# Patient Record
Sex: MALE | URBAN - METROPOLITAN AREA
[De-identification: names, ages, dates, MRNs, and addresses within clinical notes are randomized per-mention and may not be internally consistent; named-entity substitution may affect disease eponyms.]

---

## 2017-05-14 ENCOUNTER — HOSPITAL ENCOUNTER (EMERGENCY)
Facility: CLINIC | Age: 22
Discharge: HOME | End: 2017-05-14
Attending: INTERNAL MEDICINE | Admitting: INTERNAL MEDICINE

## 2017-05-14 VITALS
RESPIRATION RATE: 20 BRPM | TEMPERATURE: 98.5 F | DIASTOLIC BLOOD PRESSURE: 90 MMHG | OXYGEN SATURATION: 98 % | SYSTOLIC BLOOD PRESSURE: 136 MMHG

## 2017-05-14 DIAGNOSIS — H16.002 CORNEAL ULCER, LEFT: ICD-10-CM

## 2017-05-14 PROCEDURE — 99283 EMERGENCY DEPT VISIT LOW MDM: CPT

## 2017-05-14 RX ORDER — PROPARACAINE HYDROCHLORIDE 5 MG/ML
SOLUTION/ DROPS OPHTHALMIC
Status: DISCONTINUED
Start: 2017-05-14 | End: 2017-05-14 | Stop reason: HOSPADM

## 2017-05-14 ASSESSMENT — ENCOUNTER SYMPTOMS
EYE REDNESS: 1
EYE ITCHING: 1
EYE PAIN: 1

## 2017-05-14 NOTE — DISCHARGE INSTRUCTIONS
Corneal Ulcer    The cornea is the clear part in the front of the eye. A corneal ulcer is an open sore on the cornea.    The most common cause of a corneal ulcer is infection by bacteria, virus, or fungus. A scratch to the cornea that becomes infected can lead to a corneal ulcer. Use of contact lenses also increases the risk of a corneal ulcer from a bacterial infection. This can happen especially if contact lenses are not kept clean or are worn while sleeping. Any condition that causes dry eyes, such as decreased tear production or inability to blink normally, will increase the risk of a corneal ulcer. Chemical injury to the eye is another risk factor for a corneal ulcer.    A corneal ulcer may cause redness, pain, increased tears, and pus or mucus draining from the eye. Your vision may be blurry and the eyelid may swell.    Corneal ulcers are very serious. They may cause permanent scarring to the eye, with partial or complete blindness. Therefore, this condition must be recognized and treated very carefully. With proper treatment, corneal ulcers should improve in 2 to 3 weeks. Follow-up with an ophthalmologist (an eye MD) is very important.     Do not wear contact lenses until approved by your eye doctor.    Do not sleep in contact lenses.    Do not soak your contact lenses in anything other than sterile solution specifically made for contact lenses     Apply a cool compress to the eye (towel soaked in cool water).    Do not touch or rub your eye with your fingers.    Wash your hands often to prevent spread of the infection to the other eye.    You may use acetaminophen or ibuprofen to control pain, unless another pain medicine was prescribed. (Note: If you have chronic liver or kidney disease or have ever had a stomach ulcer, talk with your doctor before using these medicines.)    Use prescribed antibiotic eye drops or ointment exactly as directed.  Home care  Follow-up care  Follow up with your eye doctor in  1 to 2 days, or as advised.    For more information, see the American Academy of Ophthalmology web page on corneal ulcers: www.geteyesmart.org/eyesmart/diseases/corneal-ulcer.cfm.  When to seek medical advice  Call your healthcare provider right away if any of these occur:    Worsening vision    Increasing pain in the eye    Increased discharge from the eye    Increased redness of the eyes    Fever of 100.4 F (38 C) or higher, or as directed by your healthcare provider    9044-7895 [employer]. [address] [place] [address]. All rights reserved. This information is not intended as a substitute for professional medical care. Always follow your healthcare professional's instructions.        Remember that you can always come back to the Emergency Department if you are not able to see your regular doctor in the amount of time listed above, if you get any new symptoms, or if there is anything that worries you.

## 2017-05-14 NOTE — ED AVS SNAPSHOT
[facility] Emergency Department    [address] [place] [address]    Phone:  [phone]    Fax:  [phone]                                       Walter Crook   MRN: [id number]    Department:  [facility] Emergency Department   Date of Visit:  5/14/2017           Patient Information     Date Of Birth          1995        Your diagnoses for this visit were:     Corneal ulcer, left        You were seen by Merry Sorto MD.      Follow-up Information     Follow up In 1 day.    Why:  see your eye doctor tomorrow        Discharge Instructions           Corneal Ulcer    The cornea is the clear part in the front of the eye. A corneal ulcer is an open sore on the cornea.    The most common cause of a corneal ulcer is infection by bacteria, virus, or fungus. A scratch to the cornea that becomes infected can lead to a corneal ulcer. Use of contact lenses also increases the risk of a corneal ulcer from a bacterial infection. This can happen especially if contact lenses are not kept clean or are worn while sleeping. Any condition that causes dry eyes, such as decreased tear production or inability to blink normally, will increase the risk of a corneal ulcer. Chemical injury to the eye is another risk factor for a corneal ulcer.    A corneal ulcer may cause redness, pain, increased tears, and pus or mucus draining from the eye. Your vision may be blurry and the eyelid may swell.    Corneal ulcers are very serious. They may cause permanent scarring to the eye, with partial or complete blindness. Therefore, this condition must be recognized and treated very carefully. With proper treatment, corneal ulcers should improve in 2 to 3 weeks. Follow-up with an ophthalmologist (an eye MD) is very important.     Do not wear contact lenses until approved by your eye doctor.    Do not sleep in contact lenses.    Do not soak your contact lenses in anything other than sterile  solution specifically made for contact lenses     Apply a cool compress to the eye (towel soaked in cool water).    Do not touch or rub your eye with your fingers.    Wash your hands often to prevent spread of the infection to the other eye.    You may use acetaminophen or ibuprofen to control pain, unless another pain medicine was prescribed. (Note: If you have chronic liver or kidney disease or have ever had a stomach ulcer, talk with your doctor before using these medicines.)    Use prescribed antibiotic eye drops or ointment exactly as directed.  Home care  Follow-up care  Follow up with your eye doctor in 1 to 2 days, or as advised.    For more information, see the American Academy of Ophthalmology web page on corneal ulcers: www.geteyesmart.org/eyesmart/diseases/corneal-ulcer.cfm.  When to seek medical advice  Call your healthcare provider right away if any of these occur:    Worsening vision    Increasing pain in the eye    Increased discharge from the eye    Increased redness of the eyes    Fever of 100.4 F (38 C) or higher, or as directed by your healthcare provider    6775-8036 [employer]. [address] [place] [address]. All rights reserved. This information is not intended as a substitute for professional medical care. Always follow your healthcare professional's instructions.        Remember that you can always come back to the Emergency Department if you are not able to see your regular doctor in the amount of time listed above, if you get any new symptoms, or if there is anything that worries you.      24 Hour Appointment Hotline       To make an appointment at any [facility], call [phone] ([phone]). If you don't have a family doctor or clinic, we will help you find one. [facility] [place] clinics are conveniently located to serve the needs of you and your family.             Review of your medicines      START taking        Dose / Directions Last dose taken     tobramycin 0.3 % Oint ophthalmic ointment   Commonly known as:  TOBREX   Quantity:  3.5 g        Apply 1/2 inch ribbon of ointment   Refills:  0                Prescriptions were sent or printed at these locations (1 Prescription)                   Other Prescriptions                Printed at Department/Unit printer (1 of 1)         tobramycin (TOBREX) 0.3 % OINT ophthalmic ointment                Orders Needing Specimen Collection     None      Pending Results     No orders found from 5/12/2017 to 5/15/2017.            Pending Culture Results     No orders found from 5/12/2017 to 5/15/2017.            Pending Results Instructions     If you had any lab results that were not finalized at the time of your Discharge, you can call the ED Lab Result RN at [phone]. You will be contacted by this team for any positive Lab results or changes in treatment. The nurses are available 7 days a week from 10A to 6:30P.  You can leave a message 24 hours per day and they will return your call.        Test Results From Your Hospital Stay               Clinical Quality Measure: Blood Pressure Screening     Your blood pressure was checked while you were in the emergency department today. The last reading we obtained was  BP: 136/90 . Please read the guidelines below about what these numbers mean and what you should do about them.  If your systolic blood pressure (the top number) is less than 120 and your diastolic blood pressure (the bottom number) is less than 80, then your blood pressure is normal. There is nothing more that you need to do about it.  If your systolic blood pressure (the top number) is 120-139 or your diastolic blood pressure (the bottom number) is 80-89, your blood pressure may be higher than it should be. You should have your blood pressure rechecked within a year by a primary care provider.  If your systolic blood pressure (the top number) is 140 or greater or your diastolic blood pressure (the bottom number)  "is 90 or greater, you may have high blood pressure. High blood pressure is treatable, but if left untreated over time it can put you at risk for heart attack, stroke, or kidney failure. You should have your blood pressure rechecked by a primary care provider within the next 4 weeks.  If your provider in the emergency department today gave you specific instructions to follow-up with your doctor or provider even sooner than that, you should follow that instruction and not wait for up to 4 weeks for your follow-up visit.        Thank you for choosing [facility]       Thank you for choosing [facility] for your care. Our goal is always to provide you with excellent care. Hearing back from our patients is one way we can continue to improve our services. Please take a few minutes to complete the written survey that you may receive in the mail after you visit with us. Thank you!        [employer] Information     [employer] lets you send messages to your doctor, view your test results, renew your prescriptions, schedule appointments and more. To sign up, go to [email or web address] . Click on \"Log in\" on the left side of the screen, which will take you to the Welcome page. Then click on \"Sign up Now\" on the right side of the page.     You will be asked to enter the access code listed below, as well as some personal information. Please follow the directions to create your username and password.     Your access code is: [email or web address]  Expires: 2017  3:33 PM     Your access code will  in 90 days. If you need help or a new code, please call your [facility] clinic or [phone].        Care EveryWhere ID     This is your Care EveryWhere ID. This could be used by other organizations to access your [facility] medical records  [id number]        After Visit Summary       This is your record. Keep this with you and show to your community pharmacist(s) and doctor(s) at your next visit.                  "

## 2017-05-14 NOTE — ED AVS SNAPSHOT
[facility] Emergency Department    [address]    [facility] [address]    Phone:  [phone]    Fax:  [phone]                                       Walter Crook   MRN: [id number]    Department:  [facility] Emergency Department   Date of Visit:  5/14/2017           After Visit Summary Signature Page     I have received my discharge instructions, and my questions have been answered. I have discussed any challenges I see with this plan with the nurse or doctor.    ..........................................................................................................................................  Patient/Patient Representative Signature      ..........................................................................................................................................  Patient Representative Print Name and Relationship to Patient    ..................................................               ................................................  Date                                            Time    ..........................................................................................................................................  Reviewed by Signature/Title    ...................................................              ..............................................  Date                                                            Time

## 2017-05-14 NOTE — ED PROVIDER NOTES
"  History     Chief Complaint:  Left Eye Pain    HPI   Walter Crook is a 22 year old male who presents with left eye pain. The patient states that he had an onset of left eye pain yesterday. The patient notes redness, tearing, feeling like something is in his eye that is \"scratchy.\" The patient forgot to bring his glasses but notes that his vision seems normal. The patient normally wears contact lenses. 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 He did not get anything in his eye that he can recall. He did not recently do any welding. He was wearing his extended wear contacts when symptoms started, but took these out and threw them away.     Allergies:  No known drug allergies.    Medications:    The patient is currently on no regular medications.     Past Medical History:    History reviewed.  No significant past medical history.     Past Surgical History:    Umbilical hernia repair  Orthopedic surgery    Family History:    History reviewed. No pertinent family history.    Social History:  Marital Status: Single  Presents to the ED with mother  PCP: Ziggy Morrison     Review of Systems   Eyes: Positive for pain (left), redness (left) and itching (left).   All other systems reviewed and are negative.    Physical Exam   First Vitals:  BP: 136/90  Heart Rate: 77  Temp: 98.5  F (36.9  C)  Resp: 20  SpO2: 98 %    Physical Exam   Constitutional: He is cooperative.   Eyes: Pupils are equal, round, and reactive to light. Right eye exhibits no chemosis. Left eye exhibits no chemosis. Left conjunctiva is injected.   Slit lamp exam:       The left eye shows corneal ulcer and fluorescein uptake. The left eye shows no foreign body.       No cells in anterior chamber.  No photophobia.   Fairly large, central area of ulceration. No dendrites.  Lids everted. No evidence of FB.    Neurological: He is alert.         Emergency Department Course   Interventions:  Fluorescin 1 mg opthalmic strip  Proparacaine 0.5% opthalmic " solution    ED Course:  Nursing notes and past medical history reviewed.   I performed a physical examination of the patient as documented above.  I explained the plan with the patient who consents to this.   Findings and plan explained to the patient. Patient discharged home with instructions regarding supportive care, medications, and reasons to return. The importance of close follow-up was reviewed.     Impression & Plan    Medical Decision Making:  Walter Crook is a 22 year old man who presents with left eye pain and watering. Exam reveals a central area of ulceration. I suspect that this may be related to his contact lens. No evidence of foreign body. No evidence of iritis. He did not bring his vision correction here and was unable to do eye chart through a pinhole but he feels like his vision is not affected. We will treat with antibiotic ointment, close follow up with opthalmology. Reminded him not to use contacts until cleared by opthalmology.    Diagnosis:    ICD-10-CM    1. Corneal ulcer, left H16.002        Disposition:   Discharge to home.     Discharge Medications:   New Prescriptions    TOBRAMYCIN (TOBREX) 0.3 % OINT OPHTHALMIC OINTMENT    Apply 1/2 inch ribbon of ointment         Hallie HCAPIN, am serving as a scribe on 5/14/2017 at 3:23 PM to personally document services performed by Merry Sorto MD, based on my observations and the provider's statements to me.       Merry Sorto MD  05/14/17 0352